# Patient Record
Sex: MALE | Race: WHITE | Employment: FULL TIME | URBAN - METROPOLITAN AREA
[De-identification: names, ages, dates, MRNs, and addresses within clinical notes are randomized per-mention and may not be internally consistent; named-entity substitution may affect disease eponyms.]

---

## 2017-04-09 ENCOUNTER — HOSPITAL ENCOUNTER (EMERGENCY)
Age: 27
Discharge: HOME | End: 2017-04-09
Attending: EMERGENCY MEDICINE | Admitting: EMERGENCY MEDICINE
Payer: COMMERCIAL

## 2017-04-09 ENCOUNTER — APPOINTMENT (OUTPATIENT)
Dept: GENERAL RADIOLOGY | Age: 27
End: 2017-04-09
Attending: PHYSICIAN ASSISTANT
Payer: COMMERCIAL

## 2017-04-09 VITALS
TEMPERATURE: 98.1 F | HEART RATE: 89 BPM | SYSTOLIC BLOOD PRESSURE: 157 MMHG | BODY MASS INDEX: 30.06 KG/M2 | RESPIRATION RATE: 16 BRPM | DIASTOLIC BLOOD PRESSURE: 93 MMHG | HEIGHT: 70 IN | OXYGEN SATURATION: 98 % | WEIGHT: 210 LBS

## 2017-04-09 DIAGNOSIS — R07.9 ACUTE CHEST PAIN: Primary | ICD-10-CM

## 2017-04-09 LAB
ALBUMIN SERPL BCP-MCNC: 4.6 G/DL (ref 3.4–5)
ALBUMIN/GLOB SERPL: 1.3 {RATIO} (ref 0.8–1.7)
ALP SERPL-CCNC: 72 U/L (ref 45–117)
ALT SERPL-CCNC: 48 U/L (ref 16–61)
ANION GAP BLD CALC-SCNC: 9 MMOL/L (ref 3–18)
AST SERPL W P-5'-P-CCNC: 24 U/L (ref 15–37)
BASOPHILS # BLD AUTO: 0 K/UL (ref 0–0.06)
BASOPHILS # BLD: 0 % (ref 0–2)
BILIRUB SERPL-MCNC: 0.4 MG/DL (ref 0.2–1)
BNP SERPL-MCNC: <5 PG/ML (ref 0–450)
BUN SERPL-MCNC: 16 MG/DL (ref 7–18)
BUN/CREAT SERPL: 17 (ref 12–20)
CALCIUM SERPL-MCNC: 9.4 MG/DL (ref 8.5–10.1)
CHLORIDE SERPL-SCNC: 101 MMOL/L (ref 100–108)
CK MB CFR SERPL CALC: 0.9 % (ref 0–4)
CK MB CFR SERPL CALC: 1.2 % (ref 0–4)
CK MB SERPL-MCNC: 1.1 NG/ML (ref 5–25)
CK MB SERPL-MCNC: 1.6 NG/ML (ref 5–25)
CK SERPL-CCNC: 118 U/L (ref 39–308)
CK SERPL-CCNC: 138 U/L (ref 39–308)
CO2 SERPL-SCNC: 30 MMOL/L (ref 21–32)
CREAT SERPL-MCNC: 0.96 MG/DL (ref 0.6–1.3)
DIFFERENTIAL METHOD BLD: NORMAL
EOSINOPHIL # BLD: 0.3 K/UL (ref 0–0.4)
EOSINOPHIL NFR BLD: 2 % (ref 0–5)
ERYTHROCYTE [DISTWIDTH] IN BLOOD BY AUTOMATED COUNT: 12.3 % (ref 11.6–14.5)
GLOBULIN SER CALC-MCNC: 3.6 G/DL (ref 2–4)
GLUCOSE SERPL-MCNC: 102 MG/DL (ref 74–99)
HCT VFR BLD AUTO: 42.7 % (ref 36–48)
HGB BLD-MCNC: 15.4 G/DL (ref 13–16)
LIPASE SERPL-CCNC: 162 U/L (ref 73–393)
LYMPHOCYTES # BLD AUTO: 28 % (ref 21–52)
LYMPHOCYTES # BLD: 3.1 K/UL (ref 0.9–3.6)
MCH RBC QN AUTO: 29.7 PG (ref 24–34)
MCHC RBC AUTO-ENTMCNC: 36.1 G/DL (ref 31–37)
MCV RBC AUTO: 82.4 FL (ref 74–97)
MONOCYTES # BLD: 0.7 K/UL (ref 0.05–1.2)
MONOCYTES NFR BLD AUTO: 6 % (ref 3–10)
NEUTS SEG # BLD: 7 K/UL (ref 1.8–8)
NEUTS SEG NFR BLD AUTO: 64 % (ref 40–73)
PLATELET # BLD AUTO: 251 K/UL (ref 135–420)
PMV BLD AUTO: 10.8 FL (ref 9.2–11.8)
POTASSIUM SERPL-SCNC: 3.6 MMOL/L (ref 3.5–5.5)
PROT SERPL-MCNC: 8.2 G/DL (ref 6.4–8.2)
RBC # BLD AUTO: 5.18 M/UL (ref 4.7–5.5)
SODIUM SERPL-SCNC: 140 MMOL/L (ref 136–145)
TROPONIN I SERPL-MCNC: <0.02 NG/ML (ref 0–0.06)
TROPONIN I SERPL-MCNC: <0.02 NG/ML (ref 0–0.06)
WBC # BLD AUTO: 11.1 K/UL (ref 4.6–13.2)

## 2017-04-09 PROCEDURE — 99285 EMERGENCY DEPT VISIT HI MDM: CPT

## 2017-04-09 PROCEDURE — 71010 XR CHEST PORT: CPT

## 2017-04-09 PROCEDURE — 74011250636 HC RX REV CODE- 250/636: Performed by: PHYSICIAN ASSISTANT

## 2017-04-09 PROCEDURE — 82550 ASSAY OF CK (CPK): CPT | Performed by: PHYSICIAN ASSISTANT

## 2017-04-09 PROCEDURE — 85025 COMPLETE CBC W/AUTO DIFF WBC: CPT | Performed by: PHYSICIAN ASSISTANT

## 2017-04-09 PROCEDURE — 80053 COMPREHEN METABOLIC PANEL: CPT | Performed by: PHYSICIAN ASSISTANT

## 2017-04-09 PROCEDURE — 93005 ELECTROCARDIOGRAM TRACING: CPT

## 2017-04-09 PROCEDURE — 96374 THER/PROPH/DIAG INJ IV PUSH: CPT

## 2017-04-09 PROCEDURE — 83880 ASSAY OF NATRIURETIC PEPTIDE: CPT | Performed by: PHYSICIAN ASSISTANT

## 2017-04-09 PROCEDURE — 83690 ASSAY OF LIPASE: CPT | Performed by: PHYSICIAN ASSISTANT

## 2017-04-09 PROCEDURE — 74011000250 HC RX REV CODE- 250: Performed by: PHYSICIAN ASSISTANT

## 2017-04-09 PROCEDURE — 96375 TX/PRO/DX INJ NEW DRUG ADDON: CPT

## 2017-04-09 RX ORDER — FAMOTIDINE 10 MG/ML
20 INJECTION INTRAVENOUS
Status: COMPLETED | OUTPATIENT
Start: 2017-04-09 | End: 2017-04-09

## 2017-04-09 RX ORDER — KETOROLAC TROMETHAMINE 30 MG/ML
30 INJECTION, SOLUTION INTRAMUSCULAR; INTRAVENOUS
Status: COMPLETED | OUTPATIENT
Start: 2017-04-09 | End: 2017-04-09

## 2017-04-09 RX ORDER — TRAMADOL HYDROCHLORIDE 50 MG/1
50 TABLET ORAL
Qty: 12 TAB | Refills: 0 | Status: SHIPPED | OUTPATIENT
Start: 2017-04-09 | End: 2018-10-01

## 2017-04-09 RX ADMIN — KETOROLAC TROMETHAMINE 30 MG: 30 INJECTION, SOLUTION INTRAMUSCULAR at 15:01

## 2017-04-09 RX ADMIN — FAMOTIDINE 20 MG: 10 INJECTION, SOLUTION INTRAVENOUS at 15:01

## 2017-04-09 NOTE — ED TRIAGE NOTES
Patient c/o chest  Pain on and off since Friday night. States the pain is a 3/10 at this time. Reports that the pain is a crushing pain and radiates to left arm.

## 2017-04-09 NOTE — ED PROVIDER NOTES
HPI Comments: 2:44 PM     Dale Oh is a 31 y.o. Male with hx of HTN presenting to the ED C/O intermittent \"crushing\" chest pain which radiates down the left arm starting 2 days ago when he was laying down. There is no known trigger for his sxs. Pain is currently rated 3/10. Associated sxs include SOB and palpitations. Pt measured his pulse rate last night during one of these episodes, pulse rate measured at 130 bpm. Reports a 2 hour episode of chest pain yesterday and another episode 5 hours ago while he was at work. Denies excessive caffeine use. Pt no longer takes HTN medications (Lisinopril), states that after an appointment with his PCP, he was told he could stop taking Lisinopril. FHx of grandmother with MI at 61years old, and mother with A-fib. Reports cigarette use of 0.5 ppd for 10 years, denies illicit drug use. No hx of blood clot. Pt denies any other symptoms or complaints. Written by Donita Harp, ED Scribe, as dictated by Noy Lu PA-C      Patient is a 31 y.o. male presenting with chest pain. The history is provided by the patient. No  was used. Chest Pain (Angina)    This is a new problem. The current episode started more than 1 week ago (2 weeks ago). The pain is at a severity of 3/10. Quality: \"crushing\" The pain radiates to the left arm. Associated symptoms include irregular heartbeat, palpitations and shortness of breath. Pertinent negatives include no abdominal pain, no back pain, no cough, no dizziness, no fever, no headaches, no nausea, no vomiting and no weakness. Risk factors include male gender, hypertension, smoking/tobacco exposure and family history. His past medical history is significant for HTN. His past medical history does not include DM, DVT or PE. History reviewed. No pertinent past medical history. Past Surgical History:   Procedure Laterality Date    HX HEENT      wisdom teeth         History reviewed.  No pertinent family history. Social History     Social History    Marital status: SINGLE     Spouse name: N/A    Number of children: N/A    Years of education: N/A     Occupational History    Not on file. Social History Main Topics    Smoking status: Current Every Day Smoker    Smokeless tobacco: Not on file    Alcohol use No    Drug use: No    Sexual activity: Not on file     Other Topics Concern    Not on file     Social History Narrative    No narrative on file         ALLERGIES: Review of patient's allergies indicates no known allergies. Review of Systems   Constitutional: Negative for chills and fever. HENT: Negative for congestion. Respiratory: Positive for shortness of breath. Negative for cough. Cardiovascular: Positive for chest pain and palpitations. Gastrointestinal: Negative for abdominal pain, nausea and vomiting. Genitourinary: Negative for dysuria. Musculoskeletal: Negative for back pain. Skin: Negative for rash. Neurological: Negative for dizziness, weakness, light-headedness and headaches. Hematological: Negative for adenopathy. Vitals:    04/09/17 1430 04/09/17 1537 04/09/17 1631 04/09/17 1656   BP: (!) 151/95 149/84 (!) 148/96 (!) 157/93   Pulse: 82 84 78 89   Resp: 14 14 18 16   Temp:       SpO2: 98% 99% 99% 98%   Weight:       Height:                Physical Exam   Constitutional: He is oriented to person, place, and time. He appears well-developed and well-nourished. No distress.  male in NAD. Alert. Appears comfortable. HENT:   Head: Normocephalic and atraumatic. Right Ear: External ear normal.   Left Ear: External ear normal.   Eyes: Conjunctivae are normal. Right eye exhibits no discharge. Left eye exhibits no discharge. No scleral icterus. Neck: Normal range of motion. Neck supple. Cardiovascular: Normal rate, regular rhythm, normal heart sounds and intact distal pulses. Exam reveals no gallop and no friction rub.     No murmur heard.  Pulmonary/Chest: Effort normal and breath sounds normal. No accessory muscle usage. No tachypnea. No respiratory distress. He has no decreased breath sounds. He has no wheezes. He has no rhonchi. He has no rales. Abdominal: Soft. He exhibits no distension. Musculoskeletal: Normal range of motion. He exhibits no edema. Lymphadenopathy:     He has no cervical adenopathy. Neurological: He is alert and oriented to person, place, and time. Skin: Skin is warm and dry. He is not diaphoretic. Psychiatric: He has a normal mood and affect. Judgment normal.   Nursing note and vitals reviewed. RESULTS:    CARDIAC MONITOR NOTE:  2:44 PM   Cardiac Rhythm: NSR  Rate: 84 bpm  Intervention: none      PULSE OXIMETRY NOTE:  2:44 PM   Pulse-ox is 98% on room air  Interpretation: normal  Intervention: none      EKG interpretation: (Preliminary)  2:21 PM   NSR. Rate 89 bpm. Normal ECG, No ST elevation. EKG read by Vickie Russo PA-C      EKG interpretation: (Secondary)  4:32 PM   NSR. Rate 83 bpm. No ST elevation. No change from previous  EKG read by Vickie Russo PA-C      XR CHEST PORT   Final Result   No acute cardiopulmonary process.   As read by the radiologist.         Labs Reviewed   METABOLIC PANEL, COMPREHENSIVE - Abnormal; Notable for the following:        Result Value    Glucose 102 (*)     All other components within normal limits   CBC WITH AUTOMATED DIFF   LIPASE   CARDIAC PANEL,(CK, CKMB & TROPONIN)   PRO-BNP   CARDIAC PANEL,(CK, CKMB & TROPONIN)       No results found for this or any previous visit (from the past 12 hour(s)). MDM  Number of Diagnoses or Management Options  Acute chest pain:   Diagnosis management comments: ACS/MI, arrhythmia, pericarditis, myocarditis, GERD, CHF, PNA, bronchitis, asthma.  Doubt PE or dissection       Amount and/or Complexity of Data Reviewed  Clinical lab tests: reviewed and ordered  Tests in the radiology section of CPT®: reviewed and ordered (Chest X-ray)  Tests in the medicine section of CPT®: ordered and reviewed (EKG)  Independent visualization of images, tracings, or specimens: yes (EKG, Chest X-ray)      ED Course     MEDICATIONS GIVEN:  Medications   ketorolac (TORADOL) injection 30 mg (30 mg IntraVENous Given 4/9/17 1501)   famotidine (PF) (PEPCID) injection 20 mg (20 mg IntraVENous Given 4/9/17 1501)        Procedures    PROGRESS NOTE:  2:44 PM  Initial assessment performed. Written by ISAIAH Murillo, as dictated by Allen Harman PA-C     PROGRESS NOTE:   4:05 PM  Pain is completely resolved, he has no complaints. We are awaiting repeat enzymes and EKG. Written by ISAIAH Murillo, as dictated by Allen Harman PA-C. PROGRESS NOTE:   5:21 PM   Pt remains chest pain free. Has no complaints. Cardiac workup unremarkable. He will follow up with his PCP, Robson Lutz MD, for an outpatient cardiac stress test. Reasons to RTED discussed with pt. All questions answered. Pt feels comfortable going home at this time. Pt expressed understanding and he agrees with plan. Written by ISAIAH Murillo, as dictated by Allen Harman PA-C .       DISCHARGE NOTE:  5:22 PM   Sheryl Carmenuder  results have been reviewed with him. He has been counseled regarding his diagnosis, treatment, and plan. He verbally conveys understanding and agreement of the signs, symptoms, diagnosis, treatment and prognosis and additionally agrees to follow up as discussed. He also agrees with the care-plan and conveys that all of his questions have been answered. I have also provided discharge instructions for him that include: educational information regarding their diagnosis and treatment, and list of reasons why they would want to return to the ED prior to their follow-up appointment, should his condition change. The patient and/or family has been provided with education for proper Emergency Department utilization.     CLINICAL IMPRESSION:    1. Acute chest pain        PLAN: DISCHARGE HOME    Follow-up Information     Follow up With Details Comments Contact Info    Vaishnavi Goff MD Schedule an appointment as soon as possible for a visit in 2 days For primary care follow up [address] [facility]      [facility] EMERGENCY DEPT  As needed, If symptoms worsen [address] [place] [address]  [phone]          Discharge Medication List as of 4/9/2017  5:22 PM      START taking these medications    Details   traMADol (ULTRAM) 50 mg tablet Take 1 Tab by mouth every six (6) hours as needed for Pain. Max Daily Amount: 200 mg., Print, Disp-12 Tab, R-0         CONTINUE these medications which have NOT CHANGED    Details   Cetirizine (ZYRTEC) 10 mg cap Take  by mouth., Historical Med             ATTESTATIONS:  This note is prepared by Meir Potts, acting as Scribe for Kimberly Rodriguez PA-C . Kimberly Rodriguez PA-C : The scribe's documentation has been prepared under my direction and personally reviewed by me in its entirety. I confirm that the note above accurately reflects all work, treatment, procedures, and medical decision making performed by me.

## 2017-04-09 NOTE — DISCHARGE INSTRUCTIONS
Chest Pain: Care Instructions  Your Care Instructions  There are many things that can cause chest pain. Some are not serious and will get better on their own in a few days. But some kinds of chest pain need more testing and treatment. Your doctor may have recommended a follow-up visit in the next 8 to 12 hours. If you are not getting better, you may need more tests or treatment. Even though your doctor has released you, you still need to watch for any problems. The doctor carefully checked you, but sometimes problems can develop later. If you have new symptoms or if your symptoms do not get better, get medical care right away. If you have worse or different chest pain or pressure that lasts more than 5 minutes or you passed out (lost consciousness), call 911 or seek other emergency help right away. A medical visit is only one step in your treatment. Even if you feel better, you still need to do what your doctor recommends, such as going to all suggested follow-up appointments and taking medicines exactly as directed. This will help you recover and help prevent future problems. How can you care for yourself at home? · Rest until you feel better. · Take your medicine exactly as prescribed. Call your doctor if you think you are having a problem with your medicine. · Do not drive after taking a prescription pain medicine. When should you call for help? Call 911 if:  · You passed out (lost consciousness). · You have severe difficulty breathing. · You have symptoms of a heart attack. These may include:  ¨ Chest pain or pressure, or a strange feeling in your chest.  ¨ Sweating. ¨ Shortness of breath. ¨ Nausea or vomiting. ¨ Pain, pressure, or a strange feeling in your back, neck, jaw, or upper belly or in one or both shoulders or arms. ¨ Lightheadedness or sudden weakness. ¨ A fast or irregular heartbeat.   After you call 911, the  may tell you to chew 1 adult-strength or 2 to 4 low-dose aspirin. Wait for an ambulance. Do not try to drive yourself. Call your doctor today if:  · You have any trouble breathing. · Your chest pain gets worse. · You are dizzy or lightheaded, or you feel like you may faint. · You are not getting better as expected. · You are having new or different chest pain. Where can you learn more? Go to http://kalpana-liset.info/. Enter A120 in the search box to learn more about \"Chest Pain: Care Instructions. \"  Current as of: May 27, 2016  Content Version: 11.2  © 2111-9330 [employer]. Care instructions adapted under license by [employer] (which disclaims liability or warranty for this information). If you have questions about a medical condition or this instruction, always ask your healthcare professional. [address] any warranty or liability for your use of this information.

## 2017-04-10 LAB
ATRIAL RATE: 83 BPM
ATRIAL RATE: 89 BPM
CALCULATED P AXIS, ECG09: 47 DEGREES
CALCULATED P AXIS, ECG09: 47 DEGREES
CALCULATED R AXIS, ECG10: 33 DEGREES
CALCULATED R AXIS, ECG10: 40 DEGREES
CALCULATED T AXIS, ECG11: 22 DEGREES
CALCULATED T AXIS, ECG11: 47 DEGREES
DIAGNOSIS, 93000: NORMAL
DIAGNOSIS, 93000: NORMAL
P-R INTERVAL, ECG05: 152 MS
P-R INTERVAL, ECG05: 164 MS
Q-T INTERVAL, ECG07: 336 MS
Q-T INTERVAL, ECG07: 368 MS
QRS DURATION, ECG06: 108 MS
QRS DURATION, ECG06: 110 MS
QTC CALCULATION (BEZET), ECG08: 408 MS
QTC CALCULATION (BEZET), ECG08: 432 MS
VENTRICULAR RATE, ECG03: 83 BPM
VENTRICULAR RATE, ECG03: 89 BPM

## 2018-10-01 ENCOUNTER — HOSPITAL ENCOUNTER (EMERGENCY)
Age: 28
Discharge: HOME | End: 2018-10-02
Attending: EMERGENCY MEDICINE
Payer: COMMERCIAL

## 2018-10-01 VITALS
RESPIRATION RATE: 18 BRPM | SYSTOLIC BLOOD PRESSURE: 140 MMHG | WEIGHT: 165 LBS | HEART RATE: 73 BPM | HEIGHT: 70 IN | BODY MASS INDEX: 23.62 KG/M2 | OXYGEN SATURATION: 100 % | TEMPERATURE: 98.2 F | DIASTOLIC BLOOD PRESSURE: 88 MMHG

## 2018-10-01 DIAGNOSIS — L50.9 URTICARIA: Primary | ICD-10-CM

## 2018-10-01 DIAGNOSIS — J02.9 PHARYNGITIS, UNSPECIFIED ETIOLOGY: ICD-10-CM

## 2018-10-01 PROCEDURE — 74011636637 HC RX REV CODE- 636/637: Performed by: PHYSICIAN ASSISTANT

## 2018-10-01 PROCEDURE — 99283 EMERGENCY DEPT VISIT LOW MDM: CPT

## 2018-10-01 PROCEDURE — 87081 CULTURE SCREEN ONLY: CPT | Performed by: EMERGENCY MEDICINE

## 2018-10-01 RX ORDER — PREDNISONE 20 MG/1
60 TABLET ORAL
Status: COMPLETED | OUTPATIENT
Start: 2018-10-01 | End: 2018-10-01

## 2018-10-01 RX ORDER — DIPHENHYDRAMINE HCL 25 MG
CAPSULE ORAL
Qty: 16 CAP | Refills: 0 | Status: SHIPPED | OUTPATIENT
Start: 2018-10-01

## 2018-10-01 RX ADMIN — PREDNISONE 60 MG: 20 TABLET ORAL at 23:36

## 2018-10-01 NOTE — Clinical Note
Return for persistent or worsening symptoms, throat swelling, shortness of breath, wheezing, nausea or vomiting, worsening rash, or any other concerns.

## 2018-10-02 PROCEDURE — 74011250637 HC RX REV CODE- 250/637: Performed by: PHYSICIAN ASSISTANT

## 2018-10-02 RX ORDER — FAMOTIDINE 20 MG/1
20 TABLET, FILM COATED ORAL
Status: COMPLETED | OUTPATIENT
Start: 2018-10-02 | End: 2018-10-02

## 2018-10-02 RX ORDER — DIPHENHYDRAMINE HCL 25 MG
25 CAPSULE ORAL
Status: COMPLETED | OUTPATIENT
Start: 2018-10-02 | End: 2018-10-02

## 2018-10-02 RX ORDER — PREDNISONE 10 MG/1
TABLET ORAL
Qty: 21 TAB | Refills: 0 | Status: SHIPPED | OUTPATIENT
Start: 2018-10-02

## 2018-10-02 RX ADMIN — DIPHENHYDRAMINE HYDROCHLORIDE 25 MG: 25 CAPSULE ORAL at 00:14

## 2018-10-02 RX ADMIN — FAMOTIDINE 20 MG: 20 TABLET ORAL at 00:14

## 2018-10-02 NOTE — DISCHARGE INSTRUCTIONS
Hives: Care Instructions  Your Care Instructions  Hives are raised, red, itchy patches of skin. They are also called wheals or welts. They usually have red borders and pale centers. Hives range in size from ¼ inch to 3 inches or more across. They may seem to move from place to place on the skin. Several hives may form a large area of raised, red skin. You can get hives after an insect sting, after taking medicine or eating certain foods, or because of infection or stress. Other causes include plants, things you breathe in, makeup, heat, cold, sunlight, and latex. You cannot spread hives to other people. Hives may last a few minutes or a few days, but a single spot may last less than 36 hours. Follow-up care is a key part of your treatment and safety. Be sure to make and go to all appointments, and call your doctor if you are having problems. It's also a good idea to know your test results and keep a list of the medicines you take. How can you care for yourself at home? · Avoid whatever you think may have caused your hives, such as a certain food or medicine. However, you may not know the cause. · Put a cool, wet towel on the area to relieve itching. · Take an over-the-counter antihistamine, such as diphenhydramine (Benadryl), cetirizine (Zyrtec), or loratadine (Claritin), to help stop the hives and calm the itching. Read and follow directions on the label. These medicines can make you feel sleepy. Do not drive while using them. · Stay away from strong soaps, detergents, and chemicals. These can make itching worse. When should you call for help? Call 911 anytime you think you may need emergency care. For example, call if:    · You have symptoms of a severe allergic reaction. These may include:  ¨ Sudden raised, red areas (hives) all over your body. ¨ Swelling of the throat, mouth, lips, or tongue. ¨ Trouble breathing. ¨ Passing out (losing consciousness).  Or you may feel very lightheaded or suddenly feel weak, confused, or restless.    Call your doctor now or seek immediate medical care if:    · You have symptoms of an allergic reaction, such as:  ¨ A rash or hives (raised, red areas on the skin). ¨ Itching. ¨ Swelling. ¨ Belly pain, nausea, or vomiting.     · You get hives after you start a new medicine.     · Hives have not gone away after 24 hours.    Watch closely for changes in your health, and be sure to contact your doctor if:    · You do not get better as expected. Where can you learn more? Go to http://kalpana-liset.info/. Enter Y466 in the search box to learn more about \"Hives: Care Instructions. \"  Current as of: November 20, 2017  Content Version: 11.7  © 1439-8959 [employer]. Care instructions adapted under license by [employer] (which disclaims liability or warranty for this information). If you have questions about a medical condition or this instruction, always ask your healthcare professional. [address] any warranty or liability for your use of this information.

## 2018-10-02 NOTE — ED TRIAGE NOTES
Pt arrives ambulatory to ED with c\o rash on back, pt is bale to make needs known speaking in complete sentences, pt in nad at this time

## 2018-10-02 NOTE — ED PROVIDER NOTES
EMERGENCY DEPARTMENT HISTORY AND PHYSICAL EXAM 
 
Date: 10/1/2018 Patient Name: Glenny To History of Presenting Illness Chief Complaint Patient presents with  Rash History Provided By: Patient Chief Complaint: Rash Duration: 1 Hours Timing:  Progressive Location: Radiating from back to upper thighs Quality: itching Severity: 3 out of 10 Associated Symptoms: Denies any other sx. Additional History (Context):  
11:16 PM 
Glenny To is a 33 y.o. male with no significant PMHX who presents to the emergency department C/O a worsening 3/10 itching rash that radiates from his back to his upper thighs onset 1 hour ago. Pt denies any new foods, medications, or topical products. Did have agave in a drink tonight and thinks that may be the culprit, but has had agave before. Pt denies nausea, vomiting, wheezing, SOB. Also reports sore throat and right ear pain prior to onset of rash, was going to go to [facility] tomorrow for eval. Denies fevers and any other sxs or complaints. PCP: Stevie Ward MD 
 
Current Outpatient Prescriptions Medication Sig Dispense Refill  predniSONE (STERAPRED DS) 10 mg dose pack Take according to instructions on box. 21 Tab 0  
 diphenhydrAMINE (BENADRYL) 25 mg capsule Take 1-2 tabs every 6 hours as needed. 16 Cap 0  
 Cetirizine (ZYRTEC) 10 mg cap Take  by mouth. Past History Past Medical History: 
History reviewed. No pertinent past medical history. Past Surgical History: 
Past Surgical History:  
Procedure Laterality Date  HX HEENT    
 wisdom teeth Family History: 
History reviewed. No pertinent family history. Social History: 
Social History Substance Use Topics  Smoking status: Current Every Day Smoker Packs/day: 0.50  Smokeless tobacco: Never Used  Alcohol use No  
 
 
Allergies: 
No Known Allergies Review of Systems Review of Systems Constitutional: Negative for fever. HENT: Positive for sore throat. Respiratory: Negative for shortness of breath and wheezing. Gastrointestinal: Negative for nausea and vomiting. Skin: Positive for rash (Itching, radiating from back to upper thighs). All other systems reviewed and are negative. Physical Exam  
 
Vitals:  
 10/01/18 2256 BP: 140/88 Pulse: 73 Resp: 18 Temp: 98.2 °F (36.8 °C) SpO2: 100% Weight: 74.8 kg (165 lb) Height: 5' 10\" (1.778 m) Physical Exam  
Constitutional: He appears well-developed and well-nourished. No distress. HENT:  
Head: Normocephalic and atraumatic. Right Ear: External ear normal.  
Left Ear: External ear normal.  
Nose: Nose normal.  
Mouth/Throat: Oropharynx is clear and moist. No oropharyngeal exudate. Posterior oropharynx is erythematous but not edematous. Uvula is midline, airway is patent. Eyes: Conjunctivae are normal.  
Neck: Normal range of motion. Cardiovascular: Normal rate, regular rhythm and normal heart sounds. Pulmonary/Chest: Effort normal and breath sounds normal. No respiratory distress. He has no wheezes. Abdominal: Soft. Bowel sounds are normal. He exhibits no distension. There is no tenderness. There is no rebound and no guarding. Neurological: He is alert. Skin: Skin is warm and dry. Rash noted. He is not diaphoretic. Urticaria noted to the posterior aspect of the proximal LUE and left lower back. Psychiatric: He has a normal mood and affect. Nursing note and vitals reviewed. Diagnostic Study Results Labs - Recent Results (from the past 12 hour(s)) STREP THROAT SCREEN Collection Time: 10/01/18 11:15 PM  
Result Value Ref Range Special Requests: NO SPECIAL REQUESTS Strep Screen NEGATIVE Strep Screen (NOTE) TEST PERFORMED IN ER BY [id number] Culture result: PENDING Radiologic Studies - No orders to display CT Results  (Last 48 hours) None CXR Results  (Last 48 hours) None  
  
 
 
Medications given in the ED- Medications  
predniSONE (DELTASONE) tablet 60 mg (60 mg Oral Given 10/1/18 6849) famotidine (PEPCID) tablet 20 mg (20 mg Oral Given 10/2/18 0014) diphenhydrAMINE (BENADRYL) capsule 25 mg (25 mg Oral Given 10/2/18 0014) Medical Decision Making I am the first provider for this patient. I reviewed the vital signs, available nursing notes, past medical history, past surgical history, family history and social history. Vital Signs-Reviewed the patient's vital signs. Pulse Oximetry Analysis - 100% on RA Records Reviewed: Nursing Notes and Old Medical Records Provider Notes (Medical Decision Making): Appears well and non-toxic, presenting with urticaria onset prior to arrival. No development of sx to suggest anaphylaxis. Will give steroids, benadryl, refer to allergist for further eval. Based on today's assessment, I feel the patient is stable for discharge to home with outpatient follow up. Return precautions and referrals provided. Procedures: 
Procedures ED Course:  
11:16 PM Initial assessment performed. The patients presenting problems have been discussed, and they are in agreement with the care plan formulated and outlined with them. I have encouraged them to ask questions as they arise throughout their visit. 12:03 AM Pt reassessed. Urticaria to LUE seems to have improved; small patch noticed to left lower back that wasn't noticed before. pepcid and benadryl ordered. 12:42 AM Pt reassessed, urticaria stable. No oral edema appreciated. Ride is in the waiting room, pt is ready to go home. Diagnosis and Disposition DISCHARGE NOTE: 
12:42 AM 
Poncho Roa  results have been reviewed with him. He has been counseled regarding his diagnosis, treatment, and plan.   He verbally conveys understanding and agreement of the signs, symptoms, diagnosis, treatment and prognosis and additionally agrees to follow up as discussed. He also agrees with the care-plan and conveys that all of his questions have been answered. I have also provided discharge instructions for him that include: educational information regarding their diagnosis and treatment, and list of reasons why they would want to return to the ED prior to their follow-up appointment, should his condition change. He has been provided with education for proper emergency department utilization. CLINICAL IMPRESSION: 
 
1. Urticaria 2. Pharyngitis, unspecified etiology PLAN: 
1. D/C Home 2. Current Discharge Medication List  
  
START taking these medications Details  
predniSONE (STERAPRED DS) 10 mg dose pack Take according to instructions on box. Qty: 21 Tab, Refills: 0  
  
diphenhydrAMINE (BENADRYL) 25 mg capsule Take 1-2 tabs every 6 hours as needed. Qty: 16 Cap, Refills: 0  
  
  
 
3. Follow-up Information Follow up With Details Comments Contact Info Kacie Cleaning MD Schedule an appointment as soon as possible for a visit As needed, If symptoms worsen [address] [facility] [place] [address] [facility] 
[phone] Dorina Asencio MD Schedule an appointment as soon as possible for a visit in 2 days For allergy follow up [address] [place] [address] [address] [facility] 
[phone] [facility] EMERGENCY DEPT Go to As needed, If symptoms worsen [address] [place] [address] 
[phone]  
  
 
_______________________________ Attestations: This note is prepared by Sarita Christensen, acting as Scribe for Jose Maciel PA-C. Jose Maciel PA-C:  The scribe's documentation has been prepared under my direction and personally reviewed by me in its entirety. I confirm that the note above accurately reflects all work, treatment, procedures, and medical decision making performed by me. 
_______________________________

## 2018-10-04 LAB
B-HEM STREP THROAT QL CULT: NEGATIVE
B-HEM STREP THROAT QL CULT: NORMAL
BACTERIA SPEC CULT: NORMAL
SERVICE CMNT-IMP: NORMAL

## 2021-09-16 ENCOUNTER — APPOINTMENT (OUTPATIENT)
Dept: GENERAL RADIOLOGY | Age: 31
End: 2021-09-16
Attending: EMERGENCY MEDICINE
Payer: COMMERCIAL

## 2021-09-16 ENCOUNTER — HOSPITAL ENCOUNTER (EMERGENCY)
Age: 31
Discharge: HOME | End: 2021-09-16
Attending: EMERGENCY MEDICINE
Payer: COMMERCIAL

## 2021-09-16 VITALS
OXYGEN SATURATION: 99 % | WEIGHT: 200 LBS | HEART RATE: 82 BPM | BODY MASS INDEX: 28.63 KG/M2 | RESPIRATION RATE: 19 BRPM | HEIGHT: 70 IN | TEMPERATURE: 98 F | DIASTOLIC BLOOD PRESSURE: 99 MMHG | SYSTOLIC BLOOD PRESSURE: 150 MMHG

## 2021-09-16 DIAGNOSIS — T50.Z95A VACCINE REACTION, INITIAL ENCOUNTER: ICD-10-CM

## 2021-09-16 DIAGNOSIS — R07.9 CHEST PAIN, UNSPECIFIED TYPE: Primary | ICD-10-CM

## 2021-09-16 LAB
ANION GAP SERPL CALC-SCNC: 7 MMOL/L (ref 3–18)
ATRIAL RATE: 85 BPM
BASOPHILS # BLD: 0 K/UL (ref 0–0.1)
BASOPHILS NFR BLD: 0 % (ref 0–2)
BUN SERPL-MCNC: 15 MG/DL (ref 7–18)
BUN/CREAT SERPL: 16 (ref 12–20)
CALCIUM SERPL-MCNC: 9.2 MG/DL (ref 8.5–10.1)
CALCULATED P AXIS, ECG09: 52 DEGREES
CALCULATED R AXIS, ECG10: 50 DEGREES
CALCULATED T AXIS, ECG11: 60 DEGREES
CHLORIDE SERPL-SCNC: 106 MMOL/L (ref 100–111)
CO2 SERPL-SCNC: 26 MMOL/L (ref 21–32)
CREAT SERPL-MCNC: 0.94 MG/DL (ref 0.6–1.3)
D DIMER PPP FEU-MCNC: <0.27 UG/ML(FEU)
DIAGNOSIS, 93000: NORMAL
DIFFERENTIAL METHOD BLD: ABNORMAL
EOSINOPHIL # BLD: 0.3 K/UL (ref 0–0.4)
EOSINOPHIL NFR BLD: 2 % (ref 0–5)
ERYTHROCYTE [DISTWIDTH] IN BLOOD BY AUTOMATED COUNT: 12.3 % (ref 11.6–14.5)
GLUCOSE SERPL-MCNC: 104 MG/DL (ref 74–99)
HCT VFR BLD AUTO: 43.7 % (ref 36–48)
HGB BLD-MCNC: 15.3 G/DL (ref 13–16)
LYMPHOCYTES # BLD: 1.2 K/UL (ref 0.9–3.6)
LYMPHOCYTES NFR BLD: 10 % (ref 21–52)
MCH RBC QN AUTO: 28.9 PG (ref 24–34)
MCHC RBC AUTO-ENTMCNC: 35 G/DL (ref 31–37)
MCV RBC AUTO: 82.6 FL (ref 78–100)
MONOCYTES # BLD: 0.9 K/UL (ref 0.05–1.2)
MONOCYTES NFR BLD: 7 % (ref 3–10)
NEUTS SEG # BLD: 9.8 K/UL (ref 1.8–8)
NEUTS SEG NFR BLD: 80 % (ref 40–73)
P-R INTERVAL, ECG05: 160 MS
PLATELET # BLD AUTO: 210 K/UL (ref 135–420)
PMV BLD AUTO: 10.5 FL (ref 9.2–11.8)
POTASSIUM SERPL-SCNC: 4.1 MMOL/L (ref 3.5–5.5)
Q-T INTERVAL, ECG07: 346 MS
QRS DURATION, ECG06: 102 MS
QTC CALCULATION (BEZET), ECG08: 411 MS
RBC # BLD AUTO: 5.29 M/UL (ref 4.35–5.65)
SODIUM SERPL-SCNC: 139 MMOL/L (ref 136–145)
TROPONIN I SERPL-MCNC: <0.02 NG/ML (ref 0–0.04)
VENTRICULAR RATE, ECG03: 85 BPM
WBC # BLD AUTO: 12.4 K/UL (ref 4.6–13.2)

## 2021-09-16 PROCEDURE — 80048 BASIC METABOLIC PNL TOTAL CA: CPT

## 2021-09-16 PROCEDURE — 85025 COMPLETE CBC W/AUTO DIFF WBC: CPT

## 2021-09-16 PROCEDURE — 71046 X-RAY EXAM CHEST 2 VIEWS: CPT

## 2021-09-16 PROCEDURE — 99284 EMERGENCY DEPT VISIT MOD MDM: CPT

## 2021-09-16 PROCEDURE — 85379 FIBRIN DEGRADATION QUANT: CPT

## 2021-09-16 PROCEDURE — 84484 ASSAY OF TROPONIN QUANT: CPT

## 2021-09-16 PROCEDURE — 93005 ELECTROCARDIOGRAM TRACING: CPT
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
## 2021-09-16 NOTE — LETTER
[facility] [place]  [facility] EMERGENCY DEPT  [address] [address] [phone]    Work/School Note    Date: 9/16/2021    To Whom It May concern:      Arabella Thomas was seen and treated today in the emergency room by the following provider(s):  Attending Provider: Vignesh Mustafa MD.      Arabella Thomas is excused from work/school on 09/16/21. He is clear to return to work/school on 09/17/21.         Sincerely,          Sahra Lawrence MD

## 2021-09-19 ENCOUNTER — HOSPITAL ENCOUNTER (EMERGENCY)
Age: 31
Discharge: HOME | End: 2021-09-19
Attending: EMERGENCY MEDICINE
Payer: COMMERCIAL

## 2021-09-19 VITALS
HEIGHT: 70 IN | OXYGEN SATURATION: 98 % | SYSTOLIC BLOOD PRESSURE: 141 MMHG | WEIGHT: 200 LBS | RESPIRATION RATE: 16 BRPM | HEART RATE: 94 BPM | TEMPERATURE: 98.5 F | BODY MASS INDEX: 28.63 KG/M2 | DIASTOLIC BLOOD PRESSURE: 88 MMHG

## 2021-09-19 DIAGNOSIS — F41.0 PANIC ATTACK: ICD-10-CM

## 2021-09-19 DIAGNOSIS — R07.89 CHEST PAIN, ATYPICAL: Primary | ICD-10-CM

## 2021-09-19 LAB
ANION GAP SERPL CALC-SCNC: 6 MMOL/L (ref 3–18)
ATRIAL RATE: 82 BPM
BASOPHILS # BLD: 0.1 K/UL (ref 0–0.1)
BASOPHILS NFR BLD: 0 % (ref 0–2)
BUN SERPL-MCNC: 18 MG/DL (ref 7–18)
BUN/CREAT SERPL: 21 (ref 12–20)
CALCIUM SERPL-MCNC: 9.2 MG/DL (ref 8.5–10.1)
CALCULATED P AXIS, ECG09: 53 DEGREES
CALCULATED R AXIS, ECG10: 29 DEGREES
CALCULATED T AXIS, ECG11: 43 DEGREES
CHLORIDE SERPL-SCNC: 109 MMOL/L (ref 100–111)
CO2 SERPL-SCNC: 27 MMOL/L (ref 21–32)
CREAT SERPL-MCNC: 0.86 MG/DL (ref 0.6–1.3)
DIAGNOSIS, 93000: NORMAL
DIFFERENTIAL METHOD BLD: ABNORMAL
EOSINOPHIL # BLD: 0.3 K/UL (ref 0–0.4)
EOSINOPHIL NFR BLD: 2 % (ref 0–5)
ERYTHROCYTE [DISTWIDTH] IN BLOOD BY AUTOMATED COUNT: 12.2 % (ref 11.6–14.5)
GLUCOSE SERPL-MCNC: 102 MG/DL (ref 74–99)
HCT VFR BLD AUTO: 37.8 % (ref 36–48)
HGB BLD-MCNC: 13.2 G/DL (ref 13–16)
LYMPHOCYTES # BLD: 1.9 K/UL (ref 0.9–3.6)
LYMPHOCYTES NFR BLD: 14 % (ref 21–52)
MAGNESIUM SERPL-MCNC: 2.2 MG/DL (ref 1.6–2.6)
MCH RBC QN AUTO: 28.7 PG (ref 24–34)
MCHC RBC AUTO-ENTMCNC: 34.9 G/DL (ref 31–37)
MCV RBC AUTO: 82.2 FL (ref 78–100)
MONOCYTES # BLD: 0.9 K/UL (ref 0.05–1.2)
MONOCYTES NFR BLD: 7 % (ref 3–10)
NEUTS SEG # BLD: 10.6 K/UL (ref 1.8–8)
NEUTS SEG NFR BLD: 77 % (ref 40–73)
P-R INTERVAL, ECG05: 166 MS
PLATELET # BLD AUTO: 211 K/UL (ref 135–420)
PMV BLD AUTO: 10.5 FL (ref 9.2–11.8)
POTASSIUM SERPL-SCNC: 3.8 MMOL/L (ref 3.5–5.5)
Q-T INTERVAL, ECG07: 364 MS
QRS DURATION, ECG06: 108 MS
QTC CALCULATION (BEZET), ECG08: 425 MS
RBC # BLD AUTO: 4.6 M/UL (ref 4.35–5.65)
SODIUM SERPL-SCNC: 142 MMOL/L (ref 136–145)
TROPONIN I SERPL-MCNC: <0.02 NG/ML (ref 0–0.04)
VENTRICULAR RATE, ECG03: 82 BPM
WBC # BLD AUTO: 13.7 K/UL (ref 4.6–13.2)

## 2021-09-19 PROCEDURE — 85025 COMPLETE CBC W/AUTO DIFF WBC: CPT

## 2021-09-19 PROCEDURE — 99284 EMERGENCY DEPT VISIT MOD MDM: CPT

## 2021-09-19 PROCEDURE — 84484 ASSAY OF TROPONIN QUANT: CPT

## 2021-09-19 PROCEDURE — 83735 ASSAY OF MAGNESIUM: CPT

## 2021-09-19 PROCEDURE — 80048 BASIC METABOLIC PNL TOTAL CA: CPT

## 2021-09-19 PROCEDURE — 74011250637 HC RX REV CODE- 250/637: Performed by: EMERGENCY MEDICINE

## 2021-09-19 PROCEDURE — 93005 ELECTROCARDIOGRAM TRACING: CPT

## 2021-09-19 RX ORDER — LORAZEPAM 1 MG/1
0.5 TABLET ORAL
Status: COMPLETED | OUTPATIENT
Start: 2021-09-19 | End: 2021-09-19

## 2021-09-19 RX ORDER — OMEPRAZOLE 40 MG/1
40 CAPSULE, DELAYED RELEASE ORAL DAILY
COMMUNITY

## 2021-09-19 RX ORDER — HYDROXYZINE PAMOATE 25 MG/1
25 CAPSULE ORAL
Qty: 12 CAPSULE | Refills: 0 | Status: SHIPPED | OUTPATIENT
Start: 2021-09-19 | End: 2021-10-03

## 2021-09-19 RX ADMIN — LORAZEPAM 0.5 MG: 1 TABLET ORAL at 02:44

## 2021-09-19 NOTE — LETTER
[facility] [place]  [facility] EMERGENC[facility] DEPT  [address] [facility] [address] [phone]    Work/School Note    Date: 9/19/2021    To Whom It May concern:    Steve Austin was seen and treated today in the emergency room by the following provider(s):  Attending Provider: Merline Medin, MD.      Steve Austin may return to work on 9/21/21.     Sincerely,          Merline Medin, MD

## 2021-09-19 NOTE — ED TRIAGE NOTES
Patient reports to ED c/c HTN. PAtient recently dx with HTN, and has not followed up with PCP. Patient reports taking his b/p at home and called ask a nurse who advised patient to call 911. Patient arrives to ED alert and oriented X4, ambulates with steady gait, speech is clear, and  are equal bilaterally.

## 2021-09-19 NOTE — ED PROVIDER NOTES
EMERGENCY DEPARTMENT HISTORY AND PHYSICAL EXAM      Date: 9/19/2021  Patient Name: Girish Mclean    History of Presenting Illness     Chief Complaint   Patient presents with    Hypertension       History Provided By: Patient    HPI: Girish Mclean, 27 y.o. male with hypertension presents to the ED with cc of chest pain, palpitations, high blood pressure. Patient describes episode which began approximately 2 hours ago with dizziness followed with numbness and tingling all over but worse legs and arms, left upper chest pain localized, sharp, 7 out of 10, associated with heart racing. Patient states he generally felt weird with above symptoms. He called ask nurse and was advised to call 911. The dizziness is almost gone, chest pain is gone, numbness and tingling is still present. He states that he feels much better since he got into the ED. He had measured his blood pressure during this episode and it was 179/90 something. He has been diagnosed with high blood pressure in the past and had been on lisinopril but states his PCP and him decided to stop it. There are no other complaints, changes, or physical findings at this time. PCP: Pam Harvey MD    No current facility-administered medications on file prior to encounter. Current Outpatient Medications on File Prior to Encounter   Medication Sig Dispense Refill    omeprazole (PRILOSEC) 40 mg capsule Take 40 mg by mouth daily.  Cetirizine (ZYRTEC) 10 mg cap Take  by mouth.  predniSONE (STERAPRED DS) 10 mg dose pack Take according to instructions on box. 21 Tab 0    diphenhydrAMINE (BENADRYL) 25 mg capsule Take 1-2 tabs every 6 hours as needed.  (Patient not taking: Reported on 9/16/2021) 16 Cap 0       Past History     Past Medical History:  Past Medical History:   Diagnosis Date    Acid reflux     Gastrointestinal disorder     Psychiatric disorder     anxiety       Past Surgical History:  Past Surgical History: Procedure Laterality Date    HX HEENT      wisdom teeth       Family History:  No family history on file. Social History:  Social History     Tobacco Use    Smoking status: Current Every Day Smoker     Packs/day: 0.50    Smokeless tobacco: Never Used   Substance Use Topics    Alcohol use: No    Drug use: Yes     Types: Marijuana     Comment: occasional use       Allergies:  No Known Allergies      Review of Systems     Review of Systems   Constitutional: Positive for fatigue. Negative for chills and fever. HENT: Negative for congestion and sore throat. Respiratory: Negative for cough and shortness of breath. Cardiovascular: Positive for chest pain and palpitations. Gastrointestinal: Negative for abdominal pain, nausea and vomiting. Genitourinary: Negative for dysuria, flank pain and frequency. Musculoskeletal: Negative for arthralgias, joint swelling and myalgias. Skin: Negative for color change and wound. Neurological: Positive for dizziness and numbness. Negative for weakness, light-headedness and headaches. Hematological: Negative for adenopathy. Physical Exam     Physical Exam  Vitals and nursing note reviewed. Constitutional:       General: He is not in acute distress. Appearance: He is well-developed. He is not diaphoretic. HENT:      Head: Normocephalic and atraumatic. No right periorbital erythema or left periorbital erythema. Jaw: No trismus. Right Ear: External ear normal. No drainage or swelling. Tympanic membrane is not perforated, erythematous or bulging. Left Ear: External ear normal. No drainage or swelling. Tympanic membrane is not perforated, erythematous or bulging. Nose: Nose normal. No mucosal edema or rhinorrhea. Right Sinus: No maxillary sinus tenderness or frontal sinus tenderness. Left Sinus: No maxillary sinus tenderness or frontal sinus tenderness. Mouth/Throat:      Mouth: No oral lesions.       Dentition: No dental abscesses. Pharynx: Uvula midline. No oropharyngeal exudate, posterior oropharyngeal erythema or uvula swelling. Tonsils: No tonsillar abscesses. Eyes:      General: No scleral icterus. Right eye: No discharge. Left eye: No discharge. Conjunctiva/sclera: Conjunctivae normal.   Cardiovascular:      Rate and Rhythm: Normal rate and regular rhythm. Heart sounds: Normal heart sounds. No murmur heard. No friction rub. No gallop. Pulmonary:      Effort: Pulmonary effort is normal. No tachypnea, accessory muscle usage or respiratory distress. Breath sounds: Normal breath sounds. No decreased breath sounds, wheezing, rhonchi or rales. Abdominal:      General: Bowel sounds are normal. There is no distension. Palpations: Abdomen is soft. Tenderness: There is no abdominal tenderness. Musculoskeletal:         General: No tenderness. Normal range of motion. Cervical back: Normal range of motion and neck supple. Lymphadenopathy:      Cervical: No cervical adenopathy. Skin:     General: Skin is warm and dry. Neurological:      Mental Status: He is alert and oriented to person, place, and time. Psychiatric:         Judgment: Judgment normal.         Lab and Diagnostic Study Results     Labs -   No results found for this or any previous visit (from the past 12 hour(s)). Radiologic Studies -   @lastxrresult@  CT Results  (Last 48 hours)    None        CXR Results  (Last 48 hours)    None            Medical Decision Making   - I am the first provider for this patient. - I reviewed the vital signs, available nursing notes, past medical history, past surgical history, family history and social history. - Initial assessment performed. The patients presenting problems have been discussed, and they are in agreement with the care plan formulated and outlined with them.   I have encouraged them to ask questions as they arise throughout their visit.    Vital Signs-Reviewed the patient's vital signs. Patient Vitals for the past 12 hrs:   Temp Pulse Resp BP SpO2   09/19/21 0118 98.5 °F (36.9 °C) 94 16 (!) 155/136 97 %       Records Reviewed: Nursing Notes, Old Medical Records and Previous Laboratory Studies    The patient presents with chest pain with a differential diagnosis of  ACS, arrhythmia, acute MI, aortic dissection, dyspnea, GERD, pericarditis and panic attack      ED Course:          Provider Notes (Medical Decision Making):           Procedures   Medical Decision Makingedical Decision Making      Disposition   Disposition: Condition stable and improved  DC- Adult Discharges: All of the diagnostic tests were reviewed and questions answered. Diagnosis, care plan and treatment options were discussed. The patient understands the instructions and will follow up as directed. The patients results have been reviewed with them. They have been counseled regarding their diagnosis. The patient verbally convey understanding and agreement of the signs, symptoms, diagnosis, treatment and prognosis and additionally agrees to follow up as recommended with their PCP in 24 - 48 hours. They also agree with the care-plan and convey that all of their questions have been answered. I have also put together some discharge instructions for them that include: 1) educational information regarding their diagnosis, 2) how to care for their diagnosis at home, as well a 3) list of reasons why they would want to return to the ED prior to their follow-up appointment, should their condition change. Diagnosis:   1. Chest pain, atypical    2.  Panic attack          Disposition:     Follow-up Information     Follow up With Specialties Details Why Contact Dennis Davenport MD Family Medicine In 2 days  [address] [facility] [address] [facility] [address] [place]      [facility] EMERGENCY DEPT Emergency Medicine  If symptoms worsen [address] [facility]  [phone]          Patient's Medications   Start Taking    No medications on file   Continue Taking    CETIRIZINE (ZYRTEC) 10 MG CAP    Take  by mouth. DIPHENHYDRAMINE (BENADRYL) 25 MG CAPSULE    Take 1-2 tabs every 6 hours as needed. OMEPRAZOLE (PRILOSEC) 40 MG CAPSULE    Take 40 mg by mouth daily. PREDNISONE (STERAPRED DS) 10 MG DOSE PACK    Take according to instructions on box. These Medications have changed    No medications on file   Stop Taking    No medications on file       DISCHARGE PLAN:  1. Current Discharge Medication List      CONTINUE these medications which have NOT CHANGED    Details   omeprazole (PRILOSEC) 40 mg capsule Take 40 mg by mouth daily. Cetirizine (ZYRTEC) 10 mg cap Take  by mouth.      predniSONE (STERAPRED DS) 10 mg dose pack Take according to instructions on box. Qty: 21 Tab, Refills: 0      diphenhydrAMINE (BENADRYL) 25 mg capsule Take 1-2 tabs every 6 hours as needed. Qty: 16 Cap, Refills: 0           2. Follow-up Information     Follow up With Specialties Details Why Contact Info    Juanita Ruelas MD Family Medicine In 2 days  [address] [facility]  [phone]      [facility] EMERGENCY DEPT Emergency Medicine  If symptoms worsen [address] [place] [id number] [phone]        3. Return to ED if worse   4. Current Discharge Medication List            Diagnosis     Clinical Impression:   1. Chest pain, atypical    2. Panic attack        Attestations:    Glendy Dumont MD    Please note that this dictation was completed with [employer], the [employer] voice recognition software. Quite often unanticipated grammatical, syntax, homophones, and other interpretive errors are inadvertently transcribed by the computer software. Please disregard these errors. Please excuse any errors that have escaped final proofreading. Thank you.

## 2022-01-31 ENCOUNTER — HOSPITAL ENCOUNTER (EMERGENCY)
Age: 32
Discharge: HOME | End: 2022-01-31
Attending: EMERGENCY MEDICINE
Payer: COMMERCIAL

## 2022-01-31 VITALS
HEIGHT: 70 IN | RESPIRATION RATE: 18 BRPM | DIASTOLIC BLOOD PRESSURE: 90 MMHG | TEMPERATURE: 98.9 F | BODY MASS INDEX: 29.35 KG/M2 | SYSTOLIC BLOOD PRESSURE: 150 MMHG | OXYGEN SATURATION: 98 % | HEART RATE: 109 BPM | WEIGHT: 205 LBS

## 2022-01-31 DIAGNOSIS — F41.0 PANIC ATTACK: Primary | ICD-10-CM

## 2022-01-31 LAB
ATRIAL RATE: 98 BPM
CALCULATED P AXIS, ECG09: 54 DEGREES
CALCULATED R AXIS, ECG10: 25 DEGREES
CALCULATED T AXIS, ECG11: 45 DEGREES
DIAGNOSIS, 93000: NORMAL
P-R INTERVAL, ECG05: 166 MS
Q-T INTERVAL, ECG07: 342 MS
QRS DURATION, ECG06: 104 MS
QTC CALCULATION (BEZET), ECG08: 436 MS
VENTRICULAR RATE, ECG03: 98 BPM

## 2022-01-31 PROCEDURE — 74011250637 HC RX REV CODE- 250/637: Performed by: EMERGENCY MEDICINE

## 2022-01-31 PROCEDURE — 99284 EMERGENCY DEPT VISIT MOD MDM: CPT

## 2022-01-31 PROCEDURE — 93005 ELECTROCARDIOGRAM TRACING: CPT

## 2022-01-31 RX ORDER — LORAZEPAM 1 MG/1
0.5 TABLET ORAL
Status: COMPLETED | OUTPATIENT
Start: 2022-01-31 | End: 2022-01-31

## 2022-01-31 RX ADMIN — LORAZEPAM 0.5 MG: 1 TABLET ORAL at 03:24

## 2022-01-31 NOTE — ED PROVIDER NOTES
EMERGENCY DEPARTMENT HISTORY AND PHYSICAL EXAM      Date: 1/31/2022  Patient Name: Concetta Jean      History of Presenting Illness     Chief Complaint   Patient presents with    Anxiety     Anxiety attack. History Provided By: Patient    HPI: Concetta Jean, 27 y.o. male with  No significant past medical history except reflux and anxiety presents to the ED with chief complaint of \"anxiety attack\". Symptoms began about an hour and half prior to ED arrival.  States he began feeling his heart racing followed with some shaking. He states that his usual anxiety attacks do not involve shaking and thus why he was concerned. In terms almost gone on ED arrival.  Denies shortness of breath or chest pain. Did have some numbness and tingling all over. No headache, no tunnel vision, no chest pain or shortness of breath. He states he was prescribed antianxiety medication by his PCP after his last ED visit with anxiety several months ago. He however stopped taking the medication due to side effects. There are no other complaints, changes, or physical findings at this time. PCP: Vaishnavi Goff MD    Current Facility-Administered Medications   Medication Dose Route Frequency Provider Last Rate Last Admin    LORazepam (ATIVAN) tablet 0.5 mg  0.5 mg Oral NOW Joesph Madrigal MD         Current Outpatient Medications   Medication Sig Dispense Refill    omeprazole (PRILOSEC) 40 mg capsule Take 40 mg by mouth daily.  predniSONE (STERAPRED DS) 10 mg dose pack Take according to instructions on box. 21 Tab 0    diphenhydrAMINE (BENADRYL) 25 mg capsule Take 1-2 tabs every 6 hours as needed. (Patient not taking: Reported on 9/16/2021) 16 Cap 0    Cetirizine (ZYRTEC) 10 mg cap Take  by mouth.          Past History     Past Medical History:  Past Medical History:   Diagnosis Date    Acid reflux     Gastrointestinal disorder     Psychiatric disorder     anxiety       Past Surgical History:  Past Surgical History:   Procedure Laterality Date    HX HEENT      wisdom teeth       Family History:  History reviewed. No pertinent family history. Social History:  Social History     Tobacco Use    Smoking status: Current Every Day Smoker     Packs/day: 0.50    Smokeless tobacco: Never Used   Substance Use Topics    Alcohol use: No    Drug use: Yes     Types: Marijuana     Comment: occasional use       Allergies:  No Known Allergies      Review of Systems     Review of Systems   Constitutional: Negative for chills and fever. HENT: Negative for congestion and sore throat. Respiratory: Negative for cough and shortness of breath. Cardiovascular: Negative for chest pain and palpitations. Gastrointestinal: Negative for abdominal pain, nausea and vomiting. Genitourinary: Negative for dysuria, flank pain and frequency. Musculoskeletal: Negative for arthralgias, joint swelling and myalgias. Skin: Negative for color change and wound. Neurological: Positive for tremors. Negative for dizziness, weakness, light-headedness and headaches. Hematological: Negative for adenopathy. Psychiatric/Behavioral: Negative for confusion, dysphoric mood, hallucinations, self-injury, sleep disturbance and suicidal ideas. The patient is nervous/anxious. Physical Exam     Physical Exam  Vitals and nursing note reviewed. Constitutional:       General: He is not in acute distress. Appearance: He is well-developed. He is not diaphoretic. Comments: Appears anxious but in no acute distress. HENT:      Head: Normocephalic and atraumatic. No right periorbital erythema or left periorbital erythema. Jaw: No trismus. Right Ear: External ear normal. No drainage or swelling. Tympanic membrane is not perforated, erythematous or bulging. Left Ear: External ear normal. No drainage or swelling. Tympanic membrane is not perforated, erythematous or bulging.       Nose: Nose normal. No mucosal edema or rhinorrhea. Right Sinus: No maxillary sinus tenderness or frontal sinus tenderness. Left Sinus: No maxillary sinus tenderness or frontal sinus tenderness. Mouth/Throat:      Mouth: No oral lesions. Dentition: No dental abscesses. Pharynx: Uvula midline. No oropharyngeal exudate, posterior oropharyngeal erythema or uvula swelling. Tonsils: No tonsillar abscesses. Eyes:      General: No scleral icterus. Right eye: No discharge. Left eye: No discharge. Conjunctiva/sclera: Conjunctivae normal.   Cardiovascular:      Rate and Rhythm: Normal rate and regular rhythm. Heart sounds: Normal heart sounds. No murmur heard. No friction rub. No gallop. Pulmonary:      Effort: Pulmonary effort is normal. No tachypnea, accessory muscle usage or respiratory distress. Breath sounds: Normal breath sounds. No decreased breath sounds, wheezing, rhonchi or rales. Abdominal:      General: Bowel sounds are normal. There is no distension. Palpations: Abdomen is soft. Tenderness: There is no abdominal tenderness. Musculoskeletal:         General: No tenderness. Normal range of motion. Cervical back: Normal range of motion and neck supple. Lymphadenopathy:      Cervical: No cervical adenopathy. Skin:     General: Skin is warm and dry. Neurological:      Mental Status: He is alert and oriented to person, place, and time.    Psychiatric:         Judgment: Judgment normal.         Lab and Diagnostic Study Results     Labs -     Recent Results (from the past 12 hour(s))   EKG, 12 LEAD, INITIAL    Collection Time: 01/31/22  2:48 AM   Result Value Ref Range    Ventricular Rate 98 BPM    Atrial Rate 98 BPM    P-R Interval 166 ms    QRS Duration 104 ms    Q-T Interval 342 ms    QTC Calculation (Bezet) 436 ms    Calculated P Axis 54 degrees    Calculated R Axis 25 degrees    Calculated T Axis 45 degrees    Diagnosis       Normal sinus rhythm  Normal ECG  When compared with ECG of 19-SEP-2021 02:30,  No significant change was found         Radiologic Studies -   [unfilled]  CT Results  (Last 48 hours)    None        CXR Results  (Last 48 hours)    None          Medical Decision Making and ED Course   - I am the first and primary provider for this patient AND AM THE PRIMARY PROVIDER OF RECORD. - I reviewed the vital signs, available nursing notes, past medical history, past surgical history, family history and social history. - Initial assessment performed. The patients presenting problems have been discussed, and the staff are in agreement with the care plan formulated and outlined with them. I have encouraged them to ask questions as they arise throughout their visit. Vital Signs-Reviewed the patient's vital signs. Patient Vitals for the past 12 hrs:   Temp Pulse Resp BP SpO2   01/31/22 0252     98 %   01/31/22 0244 98.9 °F (37.2 °C) (!) 109 18 (!) 150/90 96 %       EKG interpretation: (Preliminary): Performed at 02:48, and read at 02:48    Rhythm: normal sinus rhythm; and regular . Rate (approx.): 98; Axis: normal; KS interval: normal; QRS interval: normal ; ST/T wave: normal; Other findings: .      Records Reviewed: Nursing Notes and Old Medical Records    The patient presents with anxiety attack, history of anxiety, noncompliant with medications, presents with episode only exacerbated with tremors. ED Course:   Been evaluated in ED few months ago with similar symptoms. Was treated with Ativan and at follow-up with his PCP who put him on medications he is now not taking. His symptoms are almost resolving. Tachycardia has improved on EKG which is normal.  I do not think he needs further evaluation of the symptoms  in the ED but to follow-up with PCP so he can the put on appropriate antianxiety medications since the last he was put on gave him  side effects.            Provider Notes (Medical Decision Making):             Consultations: Consultations:         Procedures and Critical Care                     Disposition     Disposition: Condition stable and improved  DC- Adult Discharges: All of the diagnostic tests were reviewed and questions answered. Diagnosis, care plan and treatment options were discussed. The patient understands the instructions and will follow up as directed. The patients results have been reviewed with them. They have been counseled regarding their diagnosis. The patient verbally convey understanding and agreement of the signs, symptoms, diagnosis, treatment and prognosis and additionally agrees to follow up as recommended with their PCP in 24 - 48 hours. They also agree with the care-plan and convey that all of their questions have been answered. I have also put together some discharge instructions for them that include: 1) educational information regarding their diagnosis, 2) how to care for their diagnosis at home, as well a 3) list of reasons why they would want to return to the ED prior to their follow-up appointment, should their condition change. Diagnosis:   1. Panic attack          Disposition:     Follow-up Information     Follow up With Specialties Details Why Contact Info    Kitty Bermudez MD Family Medicine   [address] [place] [address] 2022  Th   [phone]            Patient's Medications   Start Taking    No medications on file   Continue Taking    CETIRIZINE (ZYRTEC) 10 MG CAP    Take  by mouth. DIPHENHYDRAMINE (BENADRYL) 25 MG CAPSULE    Take 1-2 tabs every 6 hours as needed. OMEPRAZOLE (PRILOSEC) 40 MG CAPSULE    Take 40 mg by mouth daily. PREDNISONE (STERAPRED DS) 10 MG DOSE PACK    Take according to instructions on box. These Medications have changed    No medications on file   Stop Taking    No medications on file       Remove if not discharged  DISCHARGE PLAN:  1.    Current Discharge Medication List      CONTINUE these medications which have NOT CHANGED Details   omeprazole (PRILOSEC) 40 mg capsule Take 40 mg by mouth daily. predniSONE (STERAPRED DS) 10 mg dose pack Take according to instructions on box. Qty: 21 Tab, Refills: 0      diphenhydrAMINE (BENADRYL) 25 mg capsule Take 1-2 tabs every 6 hours as needed. Qty: 16 Cap, Refills: 0      Cetirizine (ZYRTEC) 10 mg cap Take  by mouth. 2.   Follow-up Information     Follow up With Specialties Details Why Contact Info    Solomon Goff MD Family Medicine   [address] [place] [address] Road [address] [phone]          3. Return to ED if worse   4. Current Discharge Medication List          Diagnosis     Clinical Impression:   1. Panic attack        Attestations:    Peter Coker MD    Please note that this dictation was completed with [employer], the computer voice recognition software. Quite often unanticipated grammatical, syntax, homophones, and other interpretive errors are inadvertently transcribed by the computer software. Please disregard these errors. Please excuse any errors that have escaped final proofreading. Thank you.

## 2022-01-31 NOTE — ED TRIAGE NOTES
Pt presents to ED ambulatory from triage with c/o \"anxiety attack\" that began 1 1/2 hour ago;  Sudden onset of \"my heart beating fast and shaking all over\";   Pt sts he has never experienced shaking with his anxiety attacks in the past

## 2022-01-31 NOTE — ED NOTES
Pt medicated per [facility] following allergy verification. Pt has family outside to provide transportation.

## 2022-08-09 ENCOUNTER — APPOINTMENT (OUTPATIENT)
Dept: GENERAL RADIOLOGY | Age: 32
End: 2022-08-09
Attending: PHYSICIAN ASSISTANT
Payer: COMMERCIAL

## 2022-08-09 ENCOUNTER — HOSPITAL ENCOUNTER (EMERGENCY)
Age: 32
Discharge: HOME | End: 2022-08-09
Attending: STUDENT IN AN ORGANIZED HEALTH CARE EDUCATION/TRAINING PROGRAM
Payer: COMMERCIAL

## 2022-08-09 VITALS
HEART RATE: 80 BPM | OXYGEN SATURATION: 96 % | BODY MASS INDEX: 28.63 KG/M2 | RESPIRATION RATE: 14 BRPM | SYSTOLIC BLOOD PRESSURE: 156 MMHG | TEMPERATURE: 98 F | HEIGHT: 70 IN | WEIGHT: 200 LBS | DIASTOLIC BLOOD PRESSURE: 107 MMHG

## 2022-08-09 DIAGNOSIS — R03.0 ELEVATED BLOOD PRESSURE READING: ICD-10-CM

## 2022-08-09 DIAGNOSIS — F41.0 ANXIETY ATTACK: Primary | ICD-10-CM

## 2022-08-09 LAB
ANION GAP SERPL CALC-SCNC: 4 MMOL/L (ref 3–18)
ATRIAL RATE: 82 BPM
ATRIAL RATE: 84 BPM
BASOPHILS # BLD: 0 K/UL (ref 0–0.1)
BASOPHILS NFR BLD: 1 % (ref 0–2)
BUN SERPL-MCNC: 21 MG/DL (ref 7–18)
BUN/CREAT SERPL: 21 (ref 12–20)
CALCIUM SERPL-MCNC: 8.8 MG/DL (ref 8.5–10.1)
CALCULATED P AXIS, ECG09: 40 DEGREES
CALCULATED P AXIS, ECG09: 48 DEGREES
CALCULATED R AXIS, ECG10: 27 DEGREES
CALCULATED R AXIS, ECG10: 38 DEGREES
CALCULATED T AXIS, ECG11: 50 DEGREES
CALCULATED T AXIS, ECG11: 51 DEGREES
CHLORIDE SERPL-SCNC: 110 MMOL/L (ref 100–111)
CO2 SERPL-SCNC: 25 MMOL/L (ref 21–32)
CREAT SERPL-MCNC: 0.99 MG/DL (ref 0.6–1.3)
DIAGNOSIS, 93000: NORMAL
DIAGNOSIS, 93000: NORMAL
DIFFERENTIAL METHOD BLD: ABNORMAL
EOSINOPHIL # BLD: 0.2 K/UL (ref 0–0.4)
EOSINOPHIL NFR BLD: 3 % (ref 0–5)
ERYTHROCYTE [DISTWIDTH] IN BLOOD BY AUTOMATED COUNT: 12.4 % (ref 11.6–14.5)
GLUCOSE SERPL-MCNC: 109 MG/DL (ref 74–99)
HCT VFR BLD AUTO: 39.5 % (ref 36–48)
HGB BLD-MCNC: 13.7 G/DL (ref 13–16)
IMM GRANULOCYTES # BLD AUTO: 0 K/UL (ref 0–0.04)
IMM GRANULOCYTES NFR BLD AUTO: 0 % (ref 0–0.5)
LYMPHOCYTES # BLD: 1.4 K/UL (ref 0.9–3.6)
LYMPHOCYTES NFR BLD: 20 % (ref 21–52)
MCH RBC QN AUTO: 28.8 PG (ref 24–34)
MCHC RBC AUTO-ENTMCNC: 34.7 G/DL (ref 31–37)
MCV RBC AUTO: 83 FL (ref 78–100)
MONOCYTES # BLD: 0.5 K/UL (ref 0.05–1.2)
MONOCYTES NFR BLD: 7 % (ref 3–10)
NEUTS SEG # BLD: 4.8 K/UL (ref 1.8–8)
NEUTS SEG NFR BLD: 70 % (ref 40–73)
NRBC # BLD: 0 K/UL (ref 0–0.01)
NRBC BLD-RTO: 0 PER 100 WBC
P-R INTERVAL, ECG05: 154 MS
P-R INTERVAL, ECG05: 158 MS
PLATELET # BLD AUTO: 183 K/UL (ref 135–420)
PMV BLD AUTO: 10.3 FL (ref 9.2–11.8)
POTASSIUM SERPL-SCNC: 4.1 MMOL/L (ref 3.5–5.5)
Q-T INTERVAL, ECG07: 358 MS
Q-T INTERVAL, ECG07: 362 MS
QRS DURATION, ECG06: 106 MS
QRS DURATION, ECG06: 108 MS
QTC CALCULATION (BEZET), ECG08: 418 MS
QTC CALCULATION (BEZET), ECG08: 427 MS
RBC # BLD AUTO: 4.76 M/UL (ref 4.35–5.65)
SODIUM SERPL-SCNC: 139 MMOL/L (ref 136–145)
TROPONIN-HIGH SENSITIVITY: 5 NG/L (ref 0–78)
TROPONIN-HIGH SENSITIVITY: 6 NG/L (ref 0–78)
VENTRICULAR RATE, ECG03: 82 BPM
VENTRICULAR RATE, ECG03: 84 BPM
WBC # BLD AUTO: 6.9 K/UL (ref 4.6–13.2)

## 2022-08-09 PROCEDURE — 93005 ELECTROCARDIOGRAM TRACING: CPT

## 2022-08-09 PROCEDURE — 84484 ASSAY OF TROPONIN QUANT: CPT

## 2022-08-09 PROCEDURE — 99285 EMERGENCY DEPT VISIT HI MDM: CPT

## 2022-08-09 PROCEDURE — 85025 COMPLETE CBC W/AUTO DIFF WBC: CPT

## 2022-08-09 PROCEDURE — 80048 BASIC METABOLIC PNL TOTAL CA: CPT

## 2022-08-09 PROCEDURE — 71045 X-RAY EXAM CHEST 1 VIEW: CPT

## 2022-08-09 RX ORDER — BUPROPION HYDROCHLORIDE 300 MG/1
300 TABLET ORAL DAILY
COMMUNITY

## 2022-08-09 NOTE — ED NOTES
I have reviewed discharge instructions with the patient. The patient verbalized understanding. Patient armband removed and shredded. JUAN Natarajan is aware of pt Bp. Instructions were given for PCP follow up.

## 2022-08-09 NOTE — ED PROVIDER NOTES
EMERGENCY DEPARTMENT HISTORY AND PHYSICAL EXAM    Date: 8/9/2022  Patient Name: Juan Mas    History of Presenting Illness     Chief Complaint   Patient presents with    Panic Attack         History Provided By: Patient    10:00 AM  Juan Mas is a 34 y.o. male with PMHX of anxiety disorder who presents to the emergency department C/O chest tightness with palpitations and feeling impending sense of doom which is consistent with his anxiety attacks. He states he has had an intermittent headache to his left temple area for the last 3 days, last night he thought it could be related to elevated blood pressure which made him have an anxiety attack. This morning he decided to check his blood pressure at home and noted it to be 431 systolic so went to patient [facility] around 9 AM where he felt more anxious with associated chest tightness and decided to come to the ER for evaluation instead to make sure \"that I am okay. \"  Patient states his anxiety is mostly related to concerns about health and has been under better control since starting Wellbutrin about 6 months ago. Pt denies fever, shortness of breath, cough, severe or thunderclap headache, sore throat, vision changes and any other sxs or complaints. PCP: Vaishnavi Goff MD    Current Outpatient Medications   Medication Sig Dispense Refill    buPROPion XL (Wellbutrin XL) 300 mg XL tablet Take 300 mg by mouth in the morning. omeprazole (PRILOSEC) 40 mg capsule Take 40 mg by mouth daily. Cetirizine 10 mg cap Take  by mouth.      predniSONE (STERAPRED DS) 10 mg dose pack Take according to instructions on box. 21 Tab 0    diphenhydrAMINE (BENADRYL) 25 mg capsule Take 1-2 tabs every 6 hours as needed.  (Patient not taking: Reported on 9/16/2021) 16 Cap 0       Past History     Past Medical History:  Past Medical History:   Diagnosis Date    Acid reflux     Gastrointestinal disorder     Psychiatric disorder     anxiety       Past Surgical History:  Past Surgical History:   Procedure Laterality Date    HX HEENT      wisdom teeth       Family History:  History reviewed. No pertinent family history. Social History:  Social History     Tobacco Use    Smoking status: Former     Packs/day: 0.50     Types: Cigarettes     Quit date: 2022     Years since quittin.6    Smokeless tobacco: Never   Substance Use Topics    Alcohol use: No    Drug use: Not Currently     Types: Marijuana     Comment: occasional use       Allergies:  No Known Allergies      Review of Systems   Review of Systems   Eyes:  Negative for visual disturbance. Respiratory:  Negative for cough and shortness of breath. Cardiovascular:  Positive for chest pain. Gastrointestinal:  Negative for nausea and vomiting. Neurological:  Positive for headaches. All other systems reviewed and are negative. Physical Exam     Vitals:    22 1127 22 1137 22 1138 22 1354   BP: (!) 150/100   (!) 156/107   Pulse: (!) 104 80     Resp: 11 14     Temp:       SpO2: 96% 100% 96%    Weight:       Height:         Physical Exam  Vital signs and nursing notes reviewed. CONSTITUTIONAL: Alert. Well-appearing; well-nourished; in no apparent distress. HEAD: Normocephalic; atraumatic. EYES: PERRL; EOM's intact. No nystagmus. Conjunctiva clear. ENT: Moist mucus membranes. NECK: Supple; FROM without difficulty, non-tender; no cervical lymphadenopathy. CV: Normal S1, S2; no murmurs, rubs, or gallops. No chest wall tenderness. RESPIRATORY: Normal chest excursion with respiration; breath sounds clear and equal bilaterally; no wheezes, rhonchi, or rales. GI: Normal bowel sounds; non-distended; non-tender; no guarding or rigidity; no palpable organomegaly. No CVA tenderness. BACK:  No evidence of trauma or deformity. Non-tender to palpation. FROM without difficulty. Negative straight leg raise bilaterally.   EXT: Normal ROM in all four extremities; non-tender to palpation. SKIN: Normal for age and race; warm; dry; good turgor; no apparent lesions or exudate. NEURO: A & O x3. Cranial nerves 2-12 intact. Motor 5/5 bilaterally. Sensation intact. PSYCH:  Mood and affect appropriate. Diagnostic Study Results     Labs -     Recent Results (from the past 12 hour(s))   EKG, 12 LEAD, INITIAL    Collection Time: 08/09/22 10:08 AM   Result Value Ref Range    Ventricular Rate 82 BPM    Atrial Rate 82 BPM    P-R Interval 154 ms    QRS Duration 108 ms    Q-T Interval 358 ms    QTC Calculation (Bezet) 418 ms    Calculated P Axis 40 degrees    Calculated R Axis 27 degrees    Calculated T Axis 51 degrees    Diagnosis       Normal sinus rhythm  Normal ECG  When compared with ECG of 31-JAN-2022 02:48,  No significant change was found     CBC WITH AUTOMATED DIFF    Collection Time: 08/09/22 10:22 AM   Result Value Ref Range    WBC 6.9 4.6 - 13.2 K/uL    RBC 4.76 4.35 - 5.65 M/uL    HGB 13.7 13.0 - 16.0 g/dL    HCT 39.5 36.0 - 48.0 %    MCV 83.0 78.0 - 100.0 FL    MCH 28.8 24.0 - 34.0 PG    MCHC 34.7 31.0 - 37.0 g/dL    RDW 12.4 11.6 - 14.5 %    PLATELET 975 531 - 512 K/uL    MPV 10.3 9.2 - 11.8 FL    NRBC 0.0 0  WBC    ABSOLUTE NRBC 0.00 0.00 - 0.01 K/uL    NEUTROPHILS 70 40 - 73 %    LYMPHOCYTES 20 (L) 21 - 52 %    MONOCYTES 7 3 - 10 %    EOSINOPHILS 3 0 - 5 %    BASOPHILS 1 0 - 2 %    IMMATURE GRANULOCYTES 0 0.0 - 0.5 %    ABS. NEUTROPHILS 4.8 1.8 - 8.0 K/UL    ABS. LYMPHOCYTES 1.4 0.9 - 3.6 K/UL    ABS. MONOCYTES 0.5 0.05 - 1.2 K/UL    ABS. EOSINOPHILS 0.2 0.0 - 0.4 K/UL    ABS. BASOPHILS 0.0 0.0 - 0.1 K/UL    ABS. IMM.  GRANS. 0.0 0.00 - 0.04 K/UL    DF AUTOMATED     METABOLIC PANEL, BASIC    Collection Time: 08/09/22 10:22 AM   Result Value Ref Range    Sodium 139 136 - 145 mmol/L    Potassium 4.1 3.5 - 5.5 mmol/L    Chloride 110 100 - 111 mmol/L    CO2 25 21 - 32 mmol/L    Anion gap 4 3.0 - 18 mmol/L    Glucose 109 (H) 74 - 99 mg/dL    BUN 21 (H) 7.0 - 18 MG/DL Creatinine 0.99 0.6 - 1.3 MG/DL    BUN/Creatinine ratio 21 (H) 12 - 20      GFR est AA >60 >60 ml/min/1.73m2    GFR est non-AA >60 >60 ml/min/1.73m2    Calcium 8.8 8.5 - 10.1 MG/DL   TROPONIN-HIGH SENSITIVITY    Collection Time: 08/09/22 10:22 AM   Result Value Ref Range    Troponin-High Sensitivity 6 0 - 78 ng/L   EKG, 12 LEAD, SUBSEQUENT    Collection Time: 08/09/22 11:46 AM   Result Value Ref Range    Ventricular Rate 84 BPM    Atrial Rate 84 BPM    P-R Interval 158 ms    QRS Duration 106 ms    Q-T Interval 362 ms    QTC Calculation (Bezet) 427 ms    Calculated P Axis 48 degrees    Calculated R Axis 38 degrees    Calculated T Axis 50 degrees    Diagnosis       Normal sinus rhythm  Normal ECG  When compared with ECG of 09-AUG-2022 10:08,  No significant change was found     TROPONIN-HIGH SENSITIVITY    Collection Time: 08/09/22 11:55 AM   Result Value Ref Range    Troponin-High Sensitivity 5 0 - 78 ng/L       Radiologic Studies -   XR CHEST PORT   Final Result      No significant abnormality. No new abnormality is seen developing since last   study. CT Results  (Last 48 hours)      None          CXR Results  (Last 48 hours)                 08/09/22 1042  XR CHEST PORT Final result    Impression:      No significant abnormality. No new abnormality is seen developing since last   study. Narrative:  A portable AP radiograph of the chest was obtained at 1023 hours:   INDICATION:  Palpitations, chest pain. COMPARISON:  Chest dated 9/16/2021. FINDINGS:        Heart and mediastinum: Unremarkable. Lungs and pleura: Clear without consolidation or pleural effusion. Aorta: Unremarkable. Bones: Within normal limits for age. Other: None. Medications given in the ED-  Medications - No data to display      Medical Decision Making   I am the first provider for this patient.     I reviewed the vital signs, available nursing notes, past medical history, past surgical history, family history and social history. Vital Signs-Reviewed the patient's vital signs. Records Reviewed: Nursing Notes      Procedures:  Procedures    ED Course:  10:00 AM   Initial assessment performed. The patients presenting problems have been discussed, and they are in agreement with the care plan formulated and outlined with them. I have encouraged them to ask questions as they arise throughout their visit. Provider Notes (Medical Decision Making): Edmond Paris is a 34 y.o. male presenting with anxiety about health with intermittent headache for the last 3 days and concerned about elevated blood pressure. He reports history of anxiety that has been better controlled on Wellbutrin for the last 6 months. Last night he started thinking that his blood pressure could be elevated and when it was at home this morning, he states it made him extremely anxious and he decided to come to ED to be checked out. His blood pressure was moderately elevated but rest of vitals stable. He appears in no distress and feels better now. His exam is normal.  Labs grossly unremarkable. EKG normal without ischemic changes and troponin negative x2. His heart score is 1, low risk of MAC E and he is PERC negative, not complaining of any chest pain or shortness of breath. Recommend low-salt/sodium diet, monitor blood pressures daily at home and follow-up with his PCP in 1 to 2 weeks for blood pressure recheck and to determine need to start antihypertensive. Diagnosis and Disposition       DISCHARGE NOTE:    Marilee Rollins  results have been reviewed with him. He has been counseled regarding his diagnosis, treatment, and plan. He verbally conveys understanding and agreement of the signs, symptoms, diagnosis, treatment and prognosis and additionally agrees to follow up as discussed. He also agrees with the care-plan and conveys that all of his questions have been answered.   I have also provided discharge instructions for him that include: educational information regarding their diagnosis and treatment, and list of reasons why they would want to return to the ED prior to their follow-up appointment, should his condition change. He has been provided with education for proper emergency department utilization. CLINICAL IMPRESSION:    1. Anxiety attack    2. Elevated blood pressure reading        PLAN:  1. D/C Home  2. Discharge Medication List as of 8/9/2022  1:56 PM        3. Follow-up Information       Follow up With Specialties Details Why Contact Norberto Meng MD Family Medicine In 1 week  [address] [facility] [address] [phone]      THE [facility] EMERGENCY DEPT Emergency Medicine  As needed, If symptoms worsen [address] [address] [phone]          _______________________________      Please note that this dictation was completed with [email or web address], the computer voice recognition software. Quite often unanticipated grammatical, syntax, homophones, and other interpretive errors are inadvertently transcribed by the computer software. Please disregard these errors. Please excuse any errors that have escaped final proofreading.

## 2022-08-09 NOTE — ED TRIAGE NOTES
Patient reports he thinks he is having panic attacks.  Reports he has a headache and elevated b/p and high pulse

## 2024-10-18 ENCOUNTER — HOSPITAL ENCOUNTER (EMERGENCY)
Facility: HOSPITAL | Age: 34
Discharge: HOME | End: 2024-10-19
Payer: COMMERCIAL

## 2024-10-18 DIAGNOSIS — U07.1 COVID: Primary | ICD-10-CM

## 2024-10-18 DIAGNOSIS — E86.0 DEHYDRATION: ICD-10-CM

## 2024-10-18 DIAGNOSIS — R50.9 ACUTE FEBRILE ILLNESS: ICD-10-CM

## 2024-10-18 LAB
EKG ATRIAL RATE: 139 BPM
EKG DIAGNOSIS: NORMAL
EKG P AXIS: 52 DEGREES
EKG P-R INTERVAL: 150 MS
EKG Q-T INTERVAL: 270 MS
EKG QRS DURATION: 96 MS
EKG QTC CALCULATION (BAZETT): 410 MS
EKG R AXIS: 57 DEGREES
EKG T AXIS: 38 DEGREES
EKG VENTRICULAR RATE: 139 BPM
FLUAV RNA SPEC QL NAA+PROBE: NOT DETECTED
FLUBV RNA SPEC QL NAA+PROBE: NOT DETECTED
S PYO DNA THROAT QL NAA+PROBE: NOT DETECTED
SARS-COV-2 RNA RESP QL NAA+PROBE: DETECTED
SOURCE: ABNORMAL

## 2024-10-18 PROCEDURE — 93005 ELECTROCARDIOGRAM TRACING: CPT | Performed by: PHYSICIAN ASSISTANT

## 2024-10-18 PROCEDURE — 87636 SARSCOV2 & INF A&B AMP PRB: CPT

## 2024-10-18 PROCEDURE — 96360 HYDRATION IV INFUSION INIT: CPT

## 2024-10-18 PROCEDURE — 87651 STREP A DNA AMP PROBE: CPT

## 2024-10-18 PROCEDURE — 99285 EMERGENCY DEPT VISIT HI MDM: CPT

## 2024-10-18 PROCEDURE — 6370000000 HC RX 637 (ALT 250 FOR IP): Performed by: PHYSICIAN ASSISTANT

## 2024-10-18 RX ORDER — 0.9 % SODIUM CHLORIDE 0.9 %
1000 INTRAVENOUS SOLUTION INTRAVENOUS ONCE
Status: COMPLETED | OUTPATIENT
Start: 2024-10-19 | End: 2024-10-19

## 2024-10-18 RX ORDER — ACETAMINOPHEN 500 MG
1000 TABLET ORAL
Status: COMPLETED | OUTPATIENT
Start: 2024-10-18 | End: 2024-10-18

## 2024-10-18 RX ORDER — IBUPROFEN 600 MG/1
600 TABLET, FILM COATED ORAL
Status: COMPLETED | OUTPATIENT
Start: 2024-10-18 | End: 2024-10-19

## 2024-10-18 RX ADMIN — ACETAMINOPHEN 1000 MG: 500 TABLET ORAL at 23:07

## 2024-10-18 ASSESSMENT — PAIN SCALES - GENERAL: PAINLEVEL_OUTOF10: 4

## 2024-10-18 ASSESSMENT — PAIN - FUNCTIONAL ASSESSMENT: PAIN_FUNCTIONAL_ASSESSMENT: 0-10

## 2024-10-18 ASSESSMENT — PAIN DESCRIPTION - LOCATION: LOCATION: CHEST

## 2024-10-19 ENCOUNTER — APPOINTMENT (OUTPATIENT)
Facility: HOSPITAL | Age: 34
End: 2024-10-19
Payer: COMMERCIAL

## 2024-10-19 VITALS
HEIGHT: 70 IN | DIASTOLIC BLOOD PRESSURE: 80 MMHG | RESPIRATION RATE: 13 BRPM | WEIGHT: 190 LBS | BODY MASS INDEX: 27.2 KG/M2 | HEART RATE: 96 BPM | OXYGEN SATURATION: 98 % | SYSTOLIC BLOOD PRESSURE: 122 MMHG | TEMPERATURE: 98.6 F

## 2024-10-19 LAB
EKG ATRIAL RATE: 104 BPM
EKG DIAGNOSIS: NORMAL
EKG P AXIS: 49 DEGREES
EKG P-R INTERVAL: 160 MS
EKG Q-T INTERVAL: 346 MS
EKG QRS DURATION: 114 MS
EKG QTC CALCULATION (BAZETT): 454 MS
EKG R AXIS: 31 DEGREES
EKG T AXIS: 32 DEGREES
EKG VENTRICULAR RATE: 104 BPM

## 2024-10-19 PROCEDURE — 96360 HYDRATION IV INFUSION INIT: CPT

## 2024-10-19 PROCEDURE — 71045 X-RAY EXAM CHEST 1 VIEW: CPT

## 2024-10-19 PROCEDURE — 6370000000 HC RX 637 (ALT 250 FOR IP): Performed by: PHYSICIAN ASSISTANT

## 2024-10-19 PROCEDURE — 93005 ELECTROCARDIOGRAM TRACING: CPT | Performed by: PHYSICIAN ASSISTANT

## 2024-10-19 PROCEDURE — 2580000003 HC RX 258: Performed by: PHYSICIAN ASSISTANT

## 2024-10-19 RX ADMIN — IBUPROFEN 600 MG: 600 TABLET, FILM COATED ORAL at 00:08

## 2024-10-19 RX ADMIN — SODIUM CHLORIDE 1000 ML: 9 INJECTION, SOLUTION INTRAVENOUS at 00:09

## 2024-10-19 NOTE — ED PROVIDER NOTES
Pulmonary:      Effort: Pulmonary effort is normal. No respiratory distress.      Breath sounds: Normal breath sounds. No wheezing, rhonchi or rales.   Abdominal:      General: Abdomen is flat. Bowel sounds are normal.      Palpations: Abdomen is soft.      Tenderness: There is no abdominal tenderness.   Musculoskeletal:         General: Normal range of motion.      Cervical back: Normal range of motion and neck supple. No rigidity.   Lymphadenopathy:      Cervical: No cervical adenopathy.   Skin:     General: Skin is warm and dry.      Capillary Refill: Capillary refill takes less than 2 seconds.      Findings: No rash.   Neurological:      General: No focal deficit present.      Mental Status: He is alert.   Psychiatric:         Mood and Affect: Mood normal.         ***     DIAGNOSTIC RESULTS   LABS:     Recent Results (from the past 24 hour(s))   EKG 12 Lead    Collection Time: 10/18/24 10:45 PM   Result Value Ref Range    Ventricular Rate 139 BPM    Atrial Rate 139 BPM    P-R Interval 150 ms    QRS Duration 96 ms    Q-T Interval 270 ms    QTc Calculation (Bazett) 410 ms    P Axis 52 degrees    R Axis 57 degrees    T Axis 38 degrees    Diagnosis       Sinus tachycardia  Otherwise normal ECG  When compared with ECG of 09-AUG-2022 11:46,  Vent. rate has increased BY  55 BPM     COVID-19 & Influenza Combo    Collection Time: 10/18/24 10:50 PM    Specimen: Nasopharyngeal   Result Value Ref Range    Source Nasopharyngeal      SARS-CoV-2, PCR Detected (A) NOTD      Rapid Influenza A By PCR Not detected NOTD      Rapid Influenza B By PCR Not detected NOTD     Group A Strep by PCR    Collection Time: 10/18/24 11:09 PM    Specimen: Throat   Result Value Ref Range    Strep Grp A PCR Not detected     EKG 12 Lead    Collection Time: 10/19/24  1:21 AM   Result Value Ref Range    Ventricular Rate 104 BPM    Atrial Rate 104 BPM    P-R Interval 160 ms    QRS Duration 114 ms    Q-T Interval 346 ms    QTc Calculation (Bazett)  summary): Old medical records.  Nursing notes.    ED COURSE  ED Course as of 10/23/24 2211   Fri Oct 18, 2024   2251 EKG was sinus tachycardia at 139 bpm, QTc 410, no STEMI, read by Dr. Zeng at 2245 [EC]   Sat Oct 19, 2024   0149 Resting comfortably on stretcher, feeling much better, no acute distress, heart rate below 100 with stable blood pressure [EC]      ED Course User Index  [EC] Danii Pittman, ELSA       Medial Decision Makin-year-old male presenting to the emergency department with a chief complaint of racing heart, fever and sore throat    DDX to include but not limited to:  SVT, Afib, dehydration, panic attack, covid-19, influenza, pneumonia    Reports some chest pain that feels more like his heart is going to beat out of his chest because his heart rate has been elevated intermittently.  Patient febrile and tachycardic on arrival.  Sepsis orders considered but not initiated as I had a high clinical suspicion for viral illness and patient tested positive for COVID-19.  His EKG revealed sinus tachycardia as opposed to SVT or irregular rhythm.  Although there is an IV fluids shortage, considering patient's elevated heart rate, I did give him 1 L of IV fluids which brought his heart rate down below 100.  He remained without hypoxia or tachypnea.  Chest x-ray with no cardiomegaly or evidence of focal consolidation.  He is nontoxic-appearing and is feeling much better after treatment of his fever which is now 98.6 temp.  He does have a history of hypertension and would qualify for Paxlovid.  He is unsure if he wants to take this medication or if he has to pay for this medication but requests that I send the prescription to the pharmacy and he can decide.  We did discuss the risks versus benefits of taking this medication what its intended uses and he voices understanding.  We did discuss close return precautions to the emergency department as well as symptomatic and supportive care for home.    FINAL

## 2024-10-19 NOTE — DISCHARGE INSTRUCTIONS
Be sure to increase your clear liquid intake, stay well-hydrated, you can take Mucinex over-the-counter, do not take any decongestants as these can affect your blood pressure.  Take Tylenol and Motrin for fever as needed.  Paxlovid sent to your pharmacy which is an antiviral medication.  You qualify for this medication based on your history of hypertension.  You do not have to take this medication but it can help prevent serious illness from occurring.

## 2024-10-19 NOTE — ED TRIAGE NOTES
Pt arrives to triage c/o tachycardia, chest pain, fever, and sore throat. Pt states it started yesterday morning with the sore throat. Pt states when he was laying in bed, he noticed his heart racing but it went away last night but started again today. 102.0 oral temp at triage. Pt states he noticed fever today around 3pm.

## 2024-10-21 LAB
EKG ATRIAL RATE: 104 BPM
EKG ATRIAL RATE: 139 BPM
EKG DIAGNOSIS: NORMAL
EKG DIAGNOSIS: NORMAL
EKG P AXIS: 49 DEGREES
EKG P AXIS: 52 DEGREES
EKG P-R INTERVAL: 150 MS
EKG P-R INTERVAL: 160 MS
EKG Q-T INTERVAL: 270 MS
EKG Q-T INTERVAL: 346 MS
EKG QRS DURATION: 114 MS
EKG QRS DURATION: 96 MS
EKG QTC CALCULATION (BAZETT): 410 MS
EKG QTC CALCULATION (BAZETT): 454 MS
EKG R AXIS: 31 DEGREES
EKG R AXIS: 57 DEGREES
EKG T AXIS: 32 DEGREES
EKG T AXIS: 38 DEGREES
EKG VENTRICULAR RATE: 104 BPM
EKG VENTRICULAR RATE: 139 BPM

## 2025-06-03 ENCOUNTER — HOSPITAL ENCOUNTER (OUTPATIENT)
Facility: HOSPITAL | Age: 35
Setting detail: SPECIMEN
Discharge: HOME | End: 2025-06-06

## 2025-06-03 LAB — SENTARA SPECIMEN COLLECTION: NORMAL

## 2025-06-03 PROCEDURE — 99001 SPECIMEN HANDLING PT-LAB: CPT

## 2025-06-12 ENCOUNTER — HOSPITAL ENCOUNTER (OUTPATIENT)
Facility: HOSPITAL | Age: 35
Setting detail: SPECIMEN
Discharge: HOME | End: 2025-06-15

## 2025-06-12 LAB — SENTARA SPECIMEN COLLECTION: NORMAL

## 2025-06-12 PROCEDURE — 99001 SPECIMEN HANDLING PT-LAB: CPT
